# Patient Record
Sex: FEMALE | Race: OTHER | HISPANIC OR LATINO | URBAN - METROPOLITAN AREA
[De-identification: names, ages, dates, MRNs, and addresses within clinical notes are randomized per-mention and may not be internally consistent; named-entity substitution may affect disease eponyms.]

---

## 2020-01-01 ENCOUNTER — OUTPATIENT (OUTPATIENT)
Dept: OUTPATIENT SERVICES | Facility: HOSPITAL | Age: 65
LOS: 1 days | End: 2020-01-01
Payer: MEDICAID

## 2020-01-27 DIAGNOSIS — Z71.89 OTHER SPECIFIED COUNSELING: ICD-10-CM

## 2020-02-03 ENCOUNTER — OUTPATIENT (OUTPATIENT)
Dept: OUTPATIENT SERVICES | Facility: HOSPITAL | Age: 65
LOS: 1 days | Discharge: HOME | End: 2020-02-03
Payer: MEDICAID

## 2020-02-03 DIAGNOSIS — Z12.31 ENCOUNTER FOR SCREENING MAMMOGRAM FOR MALIGNANT NEOPLASM OF BREAST: ICD-10-CM

## 2020-02-03 PROCEDURE — 77067 SCR MAMMO BI INCL CAD: CPT | Mod: 26

## 2020-02-03 PROCEDURE — 77063 BREAST TOMOSYNTHESIS BI: CPT | Mod: 26

## 2020-02-28 ENCOUNTER — APPOINTMENT (OUTPATIENT)
Dept: GASTROENTEROLOGY | Facility: CLINIC | Age: 65
End: 2020-02-28

## 2020-04-17 ENCOUNTER — APPOINTMENT (OUTPATIENT)
Dept: GASTROENTEROLOGY | Facility: CLINIC | Age: 65
End: 2020-04-17

## 2020-09-01 PROCEDURE — G9005: CPT

## 2021-01-04 ENCOUNTER — APPOINTMENT (OUTPATIENT)
Dept: SURGERY | Facility: CLINIC | Age: 66
End: 2021-01-04
Payer: MEDICAID

## 2021-01-04 VITALS
TEMPERATURE: 97 F | DIASTOLIC BLOOD PRESSURE: 86 MMHG | SYSTOLIC BLOOD PRESSURE: 134 MMHG | HEART RATE: 84 BPM | BODY MASS INDEX: 39.07 KG/M2 | HEIGHT: 60 IN | WEIGHT: 199 LBS

## 2021-01-04 DIAGNOSIS — Z80.52 FAMILY HISTORY OF MALIGNANT NEOPLASM OF BLADDER: ICD-10-CM

## 2021-01-04 DIAGNOSIS — Z87.891 PERSONAL HISTORY OF NICOTINE DEPENDENCE: ICD-10-CM

## 2021-01-04 DIAGNOSIS — Z78.9 OTHER SPECIFIED HEALTH STATUS: ICD-10-CM

## 2021-01-04 DIAGNOSIS — Z82.49 FAMILY HISTORY OF ISCHEMIC HEART DISEASE AND OTHER DISEASES OF THE CIRCULATORY SYSTEM: ICD-10-CM

## 2021-01-04 DIAGNOSIS — I10 ESSENTIAL (PRIMARY) HYPERTENSION: ICD-10-CM

## 2021-01-04 DIAGNOSIS — E78.00 PURE HYPERCHOLESTEROLEMIA, UNSPECIFIED: ICD-10-CM

## 2021-01-04 DIAGNOSIS — N82.4 OTHER FEMALE INTESTINAL-GENITAL TRACT FISTULAE: ICD-10-CM

## 2021-01-04 PROCEDURE — 99072 ADDL SUPL MATRL&STAF TM PHE: CPT

## 2021-01-04 PROCEDURE — 99204 OFFICE O/P NEW MOD 45 MIN: CPT | Mod: 25

## 2021-01-04 PROCEDURE — 46600 DIAGNOSTIC ANOSCOPY SPX: CPT

## 2021-01-05 PROBLEM — I10 HIGH BLOOD PRESSURE: Status: ACTIVE | Noted: 2021-01-04

## 2021-01-05 PROBLEM — Z87.891 FORMER SMOKER: Status: ACTIVE | Noted: 2021-01-04

## 2021-01-05 PROBLEM — Z78.9 SOCIAL ALCOHOL USE: Status: ACTIVE | Noted: 2021-01-04

## 2021-01-05 PROBLEM — N82.4 COLOVAGINAL FISTULA: Status: ACTIVE | Noted: 2021-01-04

## 2021-01-05 PROBLEM — Z78.9 CAFFEINE USE: Status: ACTIVE | Noted: 2021-01-04

## 2021-01-05 PROBLEM — E78.00 HIGH CHOLESTEROL: Status: ACTIVE | Noted: 2021-01-04

## 2021-01-05 PROBLEM — Z80.52 FAMILY HISTORY OF MALIGNANT NEOPLASM OF URINARY BLADDER: Status: ACTIVE | Noted: 2021-01-04

## 2021-01-05 PROBLEM — Z82.49 FAMILY HISTORY OF CARDIAC DISORDER: Status: ACTIVE | Noted: 2021-01-04

## 2021-01-05 LAB
ALBUMIN SERPL ELPH-MCNC: 4.3 G/DL
ALP BLD-CCNC: 97 U/L
ALT SERPL-CCNC: 20 U/L
ANION GAP SERPL CALC-SCNC: 13 MMOL/L
AST SERPL-CCNC: 18 U/L
BILIRUB SERPL-MCNC: <0.2 MG/DL
BUN SERPL-MCNC: 15 MG/DL
CALCIUM SERPL-MCNC: 9.8 MG/DL
CHLORIDE SERPL-SCNC: 100 MMOL/L
CO2 SERPL-SCNC: 24 MMOL/L
CREAT SERPL-MCNC: 0.7 MG/DL
GLUCOSE SERPL-MCNC: 97 MG/DL
POTASSIUM SERPL-SCNC: 4.6 MMOL/L
PROT SERPL-MCNC: 7.6 G/DL
SODIUM SERPL-SCNC: 137 MMOL/L

## 2021-01-05 RX ORDER — OMEPRAZOLE 20 MG/1
20 TABLET, DELAYED RELEASE ORAL
Qty: 56 | Refills: 0 | Status: ACTIVE | COMMUNITY
Start: 2020-12-11

## 2021-01-05 RX ORDER — OMEPRAZOLE 40 MG/1
40 CAPSULE, DELAYED RELEASE ORAL
Refills: 0 | Status: ACTIVE | COMMUNITY

## 2021-01-05 RX ORDER — SIMVASTATIN 40 MG/1
40 TABLET, FILM COATED ORAL
Refills: 0 | Status: ACTIVE | COMMUNITY

## 2021-01-05 RX ORDER — HYDROCORTISONE 25 MG/G
2.5 CREAM TOPICAL
Qty: 30 | Refills: 0 | Status: ACTIVE | COMMUNITY
Start: 2020-11-10

## 2021-01-05 RX ORDER — ENALAPRIL MALEATE 5 MG/1
TABLET ORAL
Refills: 0 | Status: ACTIVE | COMMUNITY

## 2021-01-05 RX ORDER — NAPROXEN 500 MG/1
500 TABLET ORAL
Qty: 60 | Refills: 0 | Status: ACTIVE | COMMUNITY
Start: 2020-11-06

## 2021-01-05 RX ORDER — POLYETHYLENE GLYCOL 3350, SODIUM SULFATE, SODIUM CHLORIDE, POTASSIUM CHLORIDE, ASCORBIC ACID, SODIUM ASCORBATE 140-9-5.2G
140 KIT ORAL
Qty: 3 | Refills: 0 | Status: ACTIVE | COMMUNITY
Start: 2020-12-22

## 2021-01-05 NOTE — ASSESSMENT
[FreeTextEntry1] : 65F with rectovaginal fistula s/p failed repair and creation of loop colostomy\par \par I had a long discussion with the patient regarding her condition.  At this time, we are unsure exactly what was done. We will begin with CTAP and colonoscopy by Dr. Solitario. Provided there no significant abnormalities we will proceed with anal manometry. Unfortunate, with the extensive scarring, thin perineal body and poor sphincter tone, my concern is that she will have significant incontinence with colostomy closure. We will see her back after her colonoscopy.

## 2021-01-05 NOTE — CONSULT LETTER
[Dear  ___] : Dear  [unfilled], [Consult Letter:] : I had the pleasure of evaluating your patient, [unfilled]. [Please see my note below.] : Please see my note below. [Consult Closing:] : Thank you very much for allowing me to participate in the care of this patient.  If you have any questions, please do not hesitate to contact me. [FreeTextEntry3] : Sincerely,\par \par Jake Choudhury MD, Colon and Rectal Surgery\par \par [employer] [facility]\par [address] [place] [address]\par [facility], 3rd Floor\par [place], [place] [address]\par Tel [phone] ext [phone]\par Fax [phone]\par

## 2021-01-05 NOTE — PHYSICAL EXAM
[Abdomen Masses] : No abdominal masses [Abdomen Tenderness] : ~T No ~M abdominal tenderness [No HSM] : no hepatosplenomegaly [Excoriation] : no perianal excoriation [Fistula] : no fistulas [Wart] : no warts [Ulcer ___ cm] : no ulcers [Pilonidal Cyst] : no pilonidal cysts [Tender, Swollen] : nontender, non-swollen [Thrombosed] : that was not thrombosed [Skin Tags] : there were no residual hemorrhoidal skin tags seen [Lax] : was lax [None] : there was no rectal mass  [Respiratory Effort] : normal respiratory effort [Normal Rate and Rhythm] : normal rate and rhythm [de-identified] : soft, obese, non tender.  Low midline incision well healed.  Left sided loop colostomy pink patent and productive of gas and stool. [de-identified] : external examination shows extensive scarring anteriorly with a very thin and scarred perineal body.  No obvious rectovaginal fistula can be appreciated. [de-identified] : awake, alert and in no acute distress

## 2021-01-05 NOTE — HISTORY OF PRESENT ILLNESS
[FreeTextEntry1] : Patient is a 65F with PMH of HTN, HLD and rectovaginal fistula who presents for evaluation.  Patient states she was recently in the [place] and was evaluated for anal pain.  At that time she was found to have large hemorrhoids and a rectovaginal fistula.  She was taken to the OR and underwent hemorrhoidectomy and fistula repair.  She states that her repair broke down after 6 days prompting exploratory laparotomy and creation of loop sigmoid colostomy.  Unfortunately we do not have any of these records.  She presents now to discuss colostomy closure.  Currently she is tolerating a diet and denies fevers, chills, nausea, vomiting, abdominal pain, constipation, diarrhea, blood in her stool or unexpected weight loss.  Her stoma works well and she empties it 2-3 times daily.  She reports incontinence to mucous per rectum.  Patient denies a family history of colon cancer rectal cancer or inflammatory bowel disease.  Patient had a colonoscopy while being treated in [place] earlier this year.

## 2021-01-05 NOTE — PROCEDURE
[FreeTextEntry1] : VIKKI and anoscopy shows a patulous anus with lax sphincter tone and poor squeeze pressure, normal internal hemorrhoids and no masses.\par

## 2021-01-08 ENCOUNTER — NON-APPOINTMENT (OUTPATIENT)
Age: 66
End: 2021-01-08

## 2021-01-12 ENCOUNTER — OUTPATIENT (OUTPATIENT)
Dept: OUTPATIENT SERVICES | Facility: HOSPITAL | Age: 66
LOS: 1 days | Discharge: HOME | End: 2021-01-12
Payer: MEDICAID

## 2021-01-12 DIAGNOSIS — R10.9 UNSPECIFIED ABDOMINAL PAIN: ICD-10-CM

## 2021-01-12 DIAGNOSIS — R10.2 PELVIC AND PERINEAL PAIN: ICD-10-CM

## 2021-01-12 PROCEDURE — 74177 CT ABD & PELVIS W/CONTRAST: CPT | Mod: 26

## 2021-01-15 ENCOUNTER — OUTPATIENT (OUTPATIENT)
Dept: OUTPATIENT SERVICES | Facility: HOSPITAL | Age: 66
LOS: 1 days | Discharge: HOME | End: 2021-01-15

## 2021-01-15 ENCOUNTER — APPOINTMENT (OUTPATIENT)
Dept: GASTROENTEROLOGY | Facility: CLINIC | Age: 66
End: 2021-01-15
Payer: MEDICAID

## 2021-01-15 DIAGNOSIS — R10.13 EPIGASTRIC PAIN: ICD-10-CM

## 2021-01-15 DIAGNOSIS — Z00.00 ENCOUNTER FOR GENERAL ADULT MEDICAL EXAMINATION W/OUT ABNORMAL FINDINGS: ICD-10-CM

## 2021-01-15 PROCEDURE — ZZZZZ: CPT

## 2021-01-15 NOTE — REVIEW OF SYSTEMS
[As Noted in HPI] : as noted in HPI [Fever] : no fever [Chest Pain] : no chest pain [Chills] : no chills [Palpitations] : no palpitations [Cough] : no cough [SOB on Exertion] : no shortness of breath during exertion [Anxiety] : no anxiety

## 2021-01-15 NOTE — END OF VISIT
[] : Fellow [FreeTextEntry3] : pt with loop colostomy, need colonoscopy prior to reversal. will also perform EGD for dyspepsia.

## 2021-01-15 NOTE — HISTORY OF PRESENT ILLNESS
[Home] : at home, [unfilled] , at the time of the visit. [Medical Office: ([facility])___] : at the medical office located in  [Family Member] : family member [Nausea] : denies nausea [Vomiting] : denies vomiting [Diarrhea] : denies diarrhea [Constipation] : denies constipation [Abdominal Pain] : denies abdominal pain [_________] : Performed [unfilled] [de-identified] : a 66 yo with HTN, DLD, dyspepsia on omeprazole was referred by dr lombardo for colonoscopy \par patient is from  [place] where rectovaginal fistula was evaluated. At that time she was found to have large hemorrhoids and a rectovaginal fistula. She was taken to the OR and underwent hemorrhoidectomy and fistula repair. She states that her repair broke down after 6 days prompting exploratory laparotomy and creation of loop sigmoid colostomy. Unfortunately we do not have any of these records. She presents now to discuss colostomy closure. Currently she is tolerating a diet and denies fevers, chills, nausea, vomiting, abdominal pain, constipation, diarrhea, blood in her stool or unexpected weight loss. Her stoma works well and she empties it 2-3 times daily. She reports incontinence to mucous per rectum. \par \par Patient denies a family history of colon cancer rectal cancer or inflammatory bowel disease. Patient had a colonoscopy while being treated in [place] this year. \par  \par

## 2021-01-15 NOTE — ASSESSMENT
[FreeTextEntry1] : a 64 yo with HTN, DLD, dyspepsia on omeprazole was referred by dr Choudhury for colonoscopy. patient had  rectovaginal fistula s/p hemorrhoidectomy and fistula repair. She states that her repair broke down after 6 days prompting exploratory laparotomy and creation of loop sigmoid colostomy. \par \par *Rectovaginal fistula with long complicated surgical procedures in [place]\par -check CBC, INR/PTT\par -will schedule colonoscopy with biopsies \par -patient has the prep from dr Solitario but would like to continue care with our clinic\par \par *Dyspepsia\par -patient counseled about stopping NSAIDs\par -c/w PPI for now \par -schedule EGD\par

## 2021-01-18 ENCOUNTER — LABORATORY RESULT (OUTPATIENT)
Age: 66
End: 2021-01-18

## 2021-01-18 ENCOUNTER — OUTPATIENT (OUTPATIENT)
Dept: OUTPATIENT SERVICES | Facility: HOSPITAL | Age: 66
LOS: 1 days | Discharge: HOME | End: 2021-01-18

## 2021-01-18 DIAGNOSIS — Z11.59 ENCOUNTER FOR SCREENING FOR OTHER VIRAL DISEASES: ICD-10-CM

## 2021-01-19 LAB
APTT BLD: 31.9 SEC
BASOPHILS # BLD AUTO: 0.07 K/UL
BASOPHILS NFR BLD AUTO: 1 %
EOSINOPHIL # BLD AUTO: 0.19 K/UL
EOSINOPHIL NFR BLD AUTO: 2.7 %
HCT VFR BLD CALC: 43.1 %
HGB BLD-MCNC: 13.5 G/DL
IMM GRANULOCYTES NFR BLD AUTO: 0.1 %
INR PPP: 1.04 RATIO
LYMPHOCYTES # BLD AUTO: 2.28 K/UL
LYMPHOCYTES NFR BLD AUTO: 32.5 %
MAN DIFF?: NORMAL
MCHC RBC-ENTMCNC: 28.8 PG
MCHC RBC-ENTMCNC: 31.3 GM/DL
MCV RBC AUTO: 91.9 FL
MONOCYTES # BLD AUTO: 0.56 K/UL
MONOCYTES NFR BLD AUTO: 8 %
NEUTROPHILS # BLD AUTO: 3.91 K/UL
NEUTROPHILS NFR BLD AUTO: 55.7 %
PLATELET # BLD AUTO: 409 K/UL
PT BLD: 12.3 SEC
RBC # BLD: 4.69 M/UL
RBC # FLD: 13.4 %
WBC # FLD AUTO: 7.02 K/UL

## 2021-01-21 ENCOUNTER — OUTPATIENT (OUTPATIENT)
Dept: OUTPATIENT SERVICES | Facility: HOSPITAL | Age: 66
LOS: 1 days | Discharge: HOME | End: 2021-01-21
Payer: MEDICAID

## 2021-01-21 ENCOUNTER — TRANSCRIPTION ENCOUNTER (OUTPATIENT)
Age: 66
End: 2021-01-21

## 2021-01-21 ENCOUNTER — RESULT REVIEW (OUTPATIENT)
Age: 66
End: 2021-01-21

## 2021-01-21 VITALS
SYSTOLIC BLOOD PRESSURE: 116 MMHG | RESPIRATION RATE: 18 BRPM | OXYGEN SATURATION: 100 % | HEART RATE: 67 BPM | DIASTOLIC BLOOD PRESSURE: 57 MMHG

## 2021-01-21 VITALS
RESPIRATION RATE: 20 BRPM | SYSTOLIC BLOOD PRESSURE: 115 MMHG | DIASTOLIC BLOOD PRESSURE: 58 MMHG | HEART RATE: 69 BPM | WEIGHT: 179.9 LBS | TEMPERATURE: 98 F | HEIGHT: 62 IN

## 2021-01-21 DIAGNOSIS — N60.02 SOLITARY CYST OF LEFT BREAST: Chronic | ICD-10-CM

## 2021-01-21 DIAGNOSIS — Z98.890 OTHER SPECIFIED POSTPROCEDURAL STATES: Chronic | ICD-10-CM

## 2021-01-21 DIAGNOSIS — Z93.3 COLOSTOMY STATUS: Chronic | ICD-10-CM

## 2021-01-21 DIAGNOSIS — N82.8 OTHER FEMALE GENITAL TRACT FISTULAE: Chronic | ICD-10-CM

## 2021-01-21 PROCEDURE — 44388 COLONOSCOPY THRU STOMA SPX: CPT

## 2021-01-21 PROCEDURE — 88312 SPECIAL STAINS GROUP 1: CPT | Mod: 26

## 2021-01-21 PROCEDURE — 88305 TISSUE EXAM BY PATHOLOGIST: CPT | Mod: 26,59

## 2021-01-21 PROCEDURE — 88305 TISSUE EXAM BY PATHOLOGIST: CPT | Mod: 26

## 2021-01-21 PROCEDURE — 43239 EGD BIOPSY SINGLE/MULTIPLE: CPT | Mod: XS

## 2021-01-21 RX ORDER — SIMVASTATIN 20 MG/1
1 TABLET, FILM COATED ORAL
Qty: 0 | Refills: 0 | DISCHARGE

## 2021-01-21 NOTE — H&P PST ADULT - HISTORY OF PRESENT ILLNESS
65 female is here for EGD for dyspepsia and colonoscopy prior to possible reversal of colostomy which was done for complicated rectovaginal fistula repair

## 2021-01-21 NOTE — ASU PATIENT PROFILE, ADULT - PSH
Breast cyst, left  removal  Fistula of vagina  rectal-vaginal fistula repair  History of hernia surgery    S/P colostomy    S/P hemorrhoidectomy

## 2021-01-22 LAB — SURGICAL PATHOLOGY STUDY: SIGNIFICANT CHANGE UP

## 2021-01-26 LAB — SURGICAL PATHOLOGY STUDY: SIGNIFICANT CHANGE UP

## 2021-01-27 DIAGNOSIS — Z93.3 COLOSTOMY STATUS: ICD-10-CM

## 2021-01-27 DIAGNOSIS — K44.9 DIAPHRAGMATIC HERNIA WITHOUT OBSTRUCTION OR GANGRENE: ICD-10-CM

## 2021-01-27 DIAGNOSIS — K63.5 POLYP OF COLON: ICD-10-CM

## 2021-01-27 DIAGNOSIS — K20.90 ESOPHAGITIS, UNSPECIFIED WITHOUT BLEEDING: ICD-10-CM

## 2021-01-27 DIAGNOSIS — K29.50 UNSPECIFIED CHRONIC GASTRITIS WITHOUT BLEEDING: ICD-10-CM

## 2021-01-27 DIAGNOSIS — K29.80 DUODENITIS WITHOUT BLEEDING: ICD-10-CM

## 2021-01-27 DIAGNOSIS — K57.30 DIVERTICULOSIS OF LARGE INTESTINE WITHOUT PERFORATION OR ABSCESS WITHOUT BLEEDING: ICD-10-CM

## 2021-01-31 PROBLEM — I10 ESSENTIAL (PRIMARY) HYPERTENSION: Chronic | Status: ACTIVE | Noted: 2021-01-21

## 2021-01-31 PROBLEM — E78.00 PURE HYPERCHOLESTEROLEMIA, UNSPECIFIED: Chronic | Status: ACTIVE | Noted: 2021-01-21

## 2021-02-01 ENCOUNTER — APPOINTMENT (OUTPATIENT)
Dept: SURGERY | Facility: CLINIC | Age: 66
End: 2021-02-01

## 2021-02-03 ENCOUNTER — APPOINTMENT (OUTPATIENT)
Dept: SURGERY | Facility: CLINIC | Age: 66
End: 2021-02-03
Payer: MEDICAID

## 2021-02-03 VITALS
HEART RATE: 100 BPM | HEIGHT: 60 IN | BODY MASS INDEX: 39.27 KG/M2 | DIASTOLIC BLOOD PRESSURE: 78 MMHG | TEMPERATURE: 97.3 F | SYSTOLIC BLOOD PRESSURE: 132 MMHG | WEIGHT: 200 LBS

## 2021-02-03 PROCEDURE — 99072 ADDL SUPL MATRL&STAF TM PHE: CPT

## 2021-02-03 PROCEDURE — 99213 OFFICE O/P EST LOW 20 MIN: CPT

## 2021-02-08 NOTE — ASSESSMENT
[FreeTextEntry1] : 65F with rectovaginal fistula s/p failed repair and creation of loop colostomy\par \par I had a long discussion with the patient regarding her condition. It appears her fistula is still present.  If this can be corrected it would require a significant reconstruction of the anal canal and perineum with repair of the fistula.   Unfortunately, we will not be able to perform this surgery here at [facility].  We will contact other colorectal surgeons in [facility] and refer the patient to a surgeon who can assist.

## 2021-02-08 NOTE — HISTORY OF PRESENT ILLNESS
[FreeTextEntry1] : Patient is a 65F with PMH of HTN, HLD and rectovaginal fistula who presents for follow up. Patient states she was recently in the [place] and was evaluated for anal pain.  At that time she was found to have large hemorrhoids and a rectovaginal fistula.  She was taken to the OR and underwent hemorrhoidectomy and fistula repair.  She states that her repair broke down after 6 days prompting exploratory laparotomy and creation of loop sigmoid colostomy.  Unfortunately we do not have any of these records. On her last visit she was sent for CTAP and colonoscopy.  CTAP did not show significant pathology and colonoscopy showed a persistent RV fistula.  She presents for follow up to discuss colostomy closure.  Currently she is tolerating a diet and denies fevers, chills, nausea, vomiting, abdominal pain, constipation, diarrhea, blood in her stool or unexpected weight loss.  Her stoma works well and she empties it 2-3 times daily.  She reports incontinence to mucous per rectum.  Patient denies a family history of colon cancer rectal cancer or inflammatory bowel disease.

## 2021-02-08 NOTE — CONSULT LETTER
[Dear  ___] : Dear  [unfilled], [Courtesy Letter:] : I had the pleasure of seeing your patient, [unfilled], in my office today. [Please see my note below.] : Please see my note below. [Consult Closing:] : Thank you very much for allowing me to participate in the care of this patient.  If you have any questions, please do not hesitate to contact me. [FreeTextEntry3] : Sincerely,\par \par Jake Choudhury MD, Colon and Rectal Surgery\par \par [employer] [facility]\par [address] [place] [address]\par [facility], 3rd Floor\par [place], [place] [address]\par Tel [phone] ext [phone]\par Fax [phone]\par

## 2021-02-08 NOTE — PHYSICAL EXAM
[Abdomen Masses] : No abdominal masses [No HSM] : no hepatosplenomegaly [Abdomen Tenderness] : ~T No ~M abdominal tenderness [Excoriation] : no perianal excoriation [Fistula] : no fistulas [Wart] : no warts [Ulcer ___ cm] : no ulcers [Pilonidal Cyst] : no pilonidal cysts [Tender, Swollen] : nontender, non-swollen [Thrombosed] : that was not thrombosed [Skin Tags] : there were no residual hemorrhoidal skin tags seen [Lax] : was lax [None] : there was no rectal mass  [Respiratory Effort] : normal respiratory effort [Normal Rate and Rhythm] : normal rate and rhythm [de-identified] : soft, obese, non tender.  Low midline incision well healed.  Left sided loop colostomy pink patent and productive of gas and stool. [de-identified] : external examination shows extensive scarring anteriorly with a very thin and scarred perineal body.  No obvious rectovaginal fistula can be appreciated. [de-identified] : awake, alert and in no acute distress

## 2021-02-19 ENCOUNTER — NON-APPOINTMENT (OUTPATIENT)
Age: 66
End: 2021-02-19

## 2021-03-23 ENCOUNTER — NON-APPOINTMENT (OUTPATIENT)
Age: 66
End: 2021-03-23

## 2021-03-23 ENCOUNTER — APPOINTMENT (OUTPATIENT)
Dept: COLORECTAL SURGERY | Facility: CLINIC | Age: 66
End: 2021-03-23
Payer: MEDICAID

## 2021-03-23 VITALS
HEIGHT: 60 IN | SYSTOLIC BLOOD PRESSURE: 112 MMHG | WEIGHT: 200 LBS | BODY MASS INDEX: 39.27 KG/M2 | TEMPERATURE: 98.4 F | HEART RATE: 78 BPM | DIASTOLIC BLOOD PRESSURE: 61 MMHG

## 2021-03-23 DIAGNOSIS — N82.3 FISTULA OF VAGINA TO LARGE INTESTINE: ICD-10-CM

## 2021-03-23 PROCEDURE — 46600 DIAGNOSTIC ANOSCOPY SPX: CPT

## 2021-03-23 PROCEDURE — 99214 OFFICE O/P EST MOD 30 MIN: CPT | Mod: 25

## 2021-03-23 PROCEDURE — 99072 ADDL SUPL MATRL&STAF TM PHE: CPT

## 2021-03-23 PROCEDURE — 99204 OFFICE O/P NEW MOD 45 MIN: CPT | Mod: 25

## 2021-03-23 RX ORDER — ENALAPRIL MALEATE 10 MG/1
10 TABLET ORAL
Refills: 0 | Status: DISCONTINUED | COMMUNITY

## 2021-03-23 NOTE — REVIEW OF SYSTEMS
[Chest Pain] : chest pain [Palpitations] : palpitations [Lower Ext Edema] : lower extremity edema [Shortness Of Breath] : shortness of breath [As Noted in HPI] : as noted in HPI [Constipation] : constipation [Incontinence] : incontinence [Sleep Disturbances] : sleep disturbances [Easy Bleeding] : a tendency for easy bleeding [Easy Bruising] : a tendency for easy bruising [Negative] : Endocrine [FreeTextEntry5] : heart murmur, irregular heart beat [FreeTextEntry7] : straining [FreeTextEntry9] : back pain

## 2021-03-23 NOTE — HISTORY OF PRESENT ILLNESS
[FreeTextEntry1] : 64 yo Congolese speaking F presents w/ daughter for evaluation of rectovaginal fistula and colostomy, referred by Dr. Choudhury\par  x 2 \par PSH of right lower abdominal hernia repair , hemorrhoidectomy, colostomy creation and fistula surgery 2020 in the [place]\par \par [place] in [place] in [place] and was evaluated for anal pain and urinary incontinence. According to patient, she had hx of fecaluria and stool via vagina for 4-5 months prior to surgery. She denies hx of diverticulitis. She was diagnosed with hemorrhoids and rectovaginal fistula.  She underwent a hemorrhoidectomy, bladder ?lift and repair of fistula.\par Six days after surgery she began to have issues and was taken to the OR for exploratory laparotomy and creation of loop sigmoid colostomy\par \par CT A/P completed 21 at [facility]\par - no acute abnormality in the abdomen and pelvis\par - s/p left lower quadrant ostomy with postsurgical changes noted\par - 1.2 cm medial limb left adrenal nodule\par \par EGD and Colonoscopy completed 21 with Dr. Leonard Sandy and Hong Linn\par - Hiatal Hernia in the gastroesophageal junction.  \par - Grade A esophagitis in the lower third of the esophagus compatible with \par non-erosive esophagitis.  \par - Erythema in the antrum and stomach body compatible with non-erosive gastritis, biopsied  \par - Normal mucosa in the whole examined duodenum, biopsied\par \par -PCF scope introduced through rectum and advanced up to 40 cm from anal orifice and scope tip reached colostomy bag and scope is withdrawn with careful examination of mucosa. Moderate diverticulosis noted through out the colon.  A small opening at dentate line noted in rectum likely diverticular vs fistula.\par \par At this point PCF scope is introduced in to proximal loop through ostomy and advanced up to cecum. Moderate diverticulosis noted through out the colon and a sessile 6 mm descending colon polyp noted and removed with cold forcep biopsy. Except above colonic mucosa appeared normal.\par \par Surgical Final Report\par \par Final Diagnosis\par Descending colon polyp:\par -  Hyperplastic polyp.\par \par Verified by: Queenie Champion M.D.\par \par Surgical Final Report\par \par Final Diagnosis\par 1. Duodenum:\par - Fragments of duodenal mucosa showing intact villous\par architecture and mild chronic nonspecific inflammation.\par \par 2. Antrum:\par - Fragments of gastric antral type mucosa showing mild chronic\par nonspecific inflammation.\par - Giemsa stain fails to reveal definite Helicobacter pylori in\par this material.\par \par 3. Body:\par - Fragments of gastric body type mucosa showing mild chronic\par nonspecific inflammation.\par - Giemsa stain fails to reveal definite Helicobacter pylori in\par this material.\par \par \par Last by Dr. Choudhury 2/3/21, extensive rectal scarring and thin perineal body, as well as lax sphincter tone noted on exam. \par \par Pt reports she is doing well, appetite and energy good\par c/o some peristomal skin irritation under ostomy appliance\par She reports daily clear non-odorous vaginal discharge and occasional scant mucus per rectum\par Pt reports hx of urinary incontinence which resolved after surgery, now c/o return of urinary incontinence for the last 2 months and has accidents if unable to get to bathroom in time. \par Denies fever, flank pain, n/v or abdominal pain\par Denies passing flatus via vagina or urethra\par Denies passing blood via vagina, denies BPR\par \par BH: 3 BMs daily, empties colostomy whenever stool present. Changes appliance every 2 days\par Takes Ciruelax QOD (prune/senna formulation) and reports adequate dietary fiber and water intake\par Denies ASA use, takes Naproxen as needed for back pain

## 2021-03-23 NOTE — PHYSICAL EXAM
[FreeTextEntry1] : Medical assistant present for duration of physical examination\par \par General no acute distress, alert and oriented\par Psych calm, pleasant demeanor, responding appropriately to questions\par Nonlabored breathing\par Ambulating without assistance\par Skin normal color and pigment, no visible lesions or rashes\par \par Abdomen obese, soft, nontender, LLQ loop colostomy pink and everted without prolapse and good appliance fit. lower midline laparotomy scar well healed, no hernias palpated\par \par Anorectal Exam:\par Inspection no erythema, induration or fluctuance, no skin excoriation, no fissure, patulous anus and thin perineal body with scarring of perineal body, anterior midline mucosal defect visualized in anal canal about 1cm proximal from external midline perineum\par VIKKI nontender, no masses palpated, no blood on gloved finger, weak tone at rest, weak squeeze\par Bimanual exam performed - with gloved finger in vagina, fistula probe gently inserted into anorectal canal defect and fell without resistance into a fistula tract communicating with the vagina\par \par Procedure: Anoscopy\par \par Pre procedure Diagnosis: rectovaginal fistula, perineal/sphincter injury\par Post procedure Diagnosis: rectovaginal fistula, perineal/sphincter injury\par Anesthesia: none\par Estimated blood loss: none\par Specimen: none\par Complications: none\par \par Consent obtained. Anoscopy was performed by passing a lighted anoscope with lubricant jelly into the anal canal and the entire anal mucosal surface was inspected. Findings included no fissure, noninflamed internal hemorrhoidal cushions, anterior midline mucosal defect visualized in anal canal about 1cm proximal from external midline perineum, scarring of anterior anal canal about 30-40% of circumference\par \par Patient tolerated examination and procedure well.\par \par \par

## 2021-03-23 NOTE — ASSESSMENT
[FreeTextEntry1] : Citizen of [place]-speaking staff present for duration of patient encounter\par \par Exam findings and diagnosis were discussed at length with patient and daughter\par H/o ano/recto-vaginal fistula, s/p attempted repair in [place], complicated by RTOR and colostomy creation.\par Patient states she had known fistula prior to surgery. Unclear underlying etiology of this fistula.\par Prior to any surgical interventions, patient reports urinary urgency and leakage of urine with each sensation to void, denies accident of full void/bladder volume. Denies h/o fecal incontinence prior to surgical interventions in [place]. Now reports incontinence to mucus from rectum.\par \par Patient provides handwritten medical record including operative note from [facility], in [place]. Will attempt to have records translated.\par \par Patient seen previously by [facility] Dr Choudhury. CTAP performed without acute abnormality seen in abdomen or pelvis. Colonoscopy performed by GI through stoma proximally and through anus to stoma significant for moderate diverticulosis through the colon, descending colon polyp s/p cold forcep biopsy (path: hyperplastic polyp ), and small opening at dentate line.\par \par Concern for persistent rectovaginal fistula and sphincter/perineal injury discussed with patient and daughter\par High risk of impairment of pelvic floor function and fecal incontinence discussed.\par Recommend anal manometry and pelvic MRI to further assess anorectal physiology and pelvic floor and to determine options for pelvic floor/sphincter/perineal body reconstruction. \par Recommend maintain colostomy until pelvic floor/sphincter/perineal injury is further assessed and managed.\par Role for multidisciplinary team approach for reconstruction discussed. Further recommendations pending review of anal manometry and pelvic MRI and discussion with  multidisciplinary  providers.\par \par All questions were answered, patient expressed understanding, and is agreeable to this plan.\par

## 2021-05-05 ENCOUNTER — APPOINTMENT (OUTPATIENT)
Dept: MRI IMAGING | Facility: CLINIC | Age: 66
End: 2021-05-05

## 2021-05-20 ENCOUNTER — NON-APPOINTMENT (OUTPATIENT)
Age: 66
End: 2021-05-20

## 2021-06-29 ENCOUNTER — NON-APPOINTMENT (OUTPATIENT)
Age: 66
End: 2021-06-29

## 2021-07-01 ENCOUNTER — APPOINTMENT (OUTPATIENT)
Dept: COLORECTAL SURGERY | Facility: CLINIC | Age: 66
End: 2021-07-01
Payer: MEDICAID

## 2021-07-01 VITALS
BODY MASS INDEX: 41.23 KG/M2 | WEIGHT: 210 LBS | SYSTOLIC BLOOD PRESSURE: 125 MMHG | DIASTOLIC BLOOD PRESSURE: 74 MMHG | HEIGHT: 60 IN | HEART RATE: 83 BPM | TEMPERATURE: 97.7 F

## 2021-07-01 DIAGNOSIS — Z93.3 COLOSTOMY STATUS: ICD-10-CM

## 2021-07-01 DIAGNOSIS — S39.94XA UNSPECIFIED INJURY OF EXTERNAL GENITALS, INITIAL ENCOUNTER: ICD-10-CM

## 2021-07-01 PROCEDURE — 99215 OFFICE O/P EST HI 40 MIN: CPT

## 2021-07-01 NOTE — ASSESSMENT
[FreeTextEntry1] : Hungarian-speaking staff present for duration of patient encounter\par MRI pelvis, endoanal ultrasound and anal manometry results reviewed and discussed at length with the patient and her daughter.\par Extensive perineal injury including large defect of internal and external anal sphincter complex at anterior midline, causing physiologic impairment in sphincter function on manometry.\par Recommend patient maintains colostomy, do not recommend reversal. Discussed with surgical colleagues at [facility], including Dr Jak Martinez, chief of colorectal surgery.\par Stoma care and education provided today. Outpatient stoma care and stoma clinic encouraged to reassess her current ostomy supply product fit.\par Patient and daughter expressed understanding of recommendations, patient requests another opinion.\par Given complexity of case, recommend evaluation by physician/medical center with specialization/experience in extensive perineal reconstruction. Daughter to look into evaluation at [facility].\par

## 2021-07-01 NOTE — PHYSICAL EXAM
[FreeTextEntry1] : Medical assistant present for duration of physical examination\par \par General no acute distress, alert and oriented\par Psych calm, pleasant demeanor, responding appropriately to questions\par Nonlabored breathing\par Ambulating without assistance\par Skin normal color and pigment\par \par Abdomen obese, soft, nontender, LLQ loop colostomy pink and everted without prolapse, appliance removed and hypergranulation/superficial ulceration noted at left lower peristomal junction\par \par Skin barrier, stoma powder, stoma ring and new appliance applied with good fit. \par \par

## 2021-07-01 NOTE — HISTORY OF PRESENT ILLNESS
[FreeTextEntry1] : 66 yo American speaking F presents w/ daughter for rectovaginal fistula \par PSH of right lower abdominal hernia repair 1989, hemorrhoidectomy, colostomy creation and fistula surgery July 2020 in the [place]\par \par Last seen in the office on 3/23/21. On abdominal exam,  obese abdomen which was soft, nontender, LLQ loop colostomy pink and everted without prolapse and good appliance fit observed. Lower midline laparotomy scar well healed, no hernias palpated. Anorectal exam performed, patulous anus and thin perineal body with scarring of perineal body, anterior midline mucosal defect visualized in anal canal about 1cm proximal from external midline perineum. VIKKI nontender, no masses palpated, no blood on gloved finger, weak tone at rest, weak squeeze. Bimanual exam performed - with gloved finger in vagina, fistula probe gently inserted into anorectal canal defect and fell without resistance into a fistula tract communicating with the vagina\par \par MRI pelvis completed 5/10/21 at [facility] \par - no evidence of colovaginal or colovesical fistula \par - 0.7 cm endometrial polyp, recommend soft tissue sampling\par - normal-appearing ovaries\par - partially visualized colostomy with bowel containing parastomal hernia\par \par Anal manometry and EUS completed 5/24/21 with Dr. Becky Robledo\par - low to normal resting pressure and low squeeze pressure. \par - Endoanal u/s reveals defect of internal and external anal sphincter muscle at anterior midline, consistent with patient's physical exam with thinner, shortened perineal body\par \par c/o persistent peristomal irritation. Reports Dr. Robledo gave her ostomy powder which she has been using with some improvement in irritation, but skin still irritated and itchy.\par Denies leakage\par Changes appliance every other day\par Denies abd pain, n/v/c/d\par Denies passing flatus via vagina or urethra, denies passing stool via vagina\par She occasionally passes mucus via rectum\par BH: emptying 3-4 times daily whenever any output present in bag. Stool has applesauce like consistency\par She continues Cirulex every other day, alternated with oatmeal w/ prunes. Reports adequate water intake\par \par Last colonoscopy completed 1/21/21\par - moderate diverticulosis noted throughout the colon\par - sessile 6 mm descending colon polyp noted and removed\par - entire rest of colon appeared normal \par

## 2023-04-07 ENCOUNTER — NON-APPOINTMENT (OUTPATIENT)
Age: 68
End: 2023-04-07

## 2023-04-13 ENCOUNTER — APPOINTMENT (OUTPATIENT)
Dept: COLORECTAL SURGERY | Facility: CLINIC | Age: 68
End: 2023-04-13